# Patient Record
Sex: MALE | Race: WHITE | Employment: OTHER | URBAN - METROPOLITAN AREA
[De-identification: names, ages, dates, MRNs, and addresses within clinical notes are randomized per-mention and may not be internally consistent; named-entity substitution may affect disease eponyms.]

---

## 2017-04-05 ENCOUNTER — OFFICE VISIT (OUTPATIENT)
Dept: PEDIATRICS | Facility: CLINIC | Age: 62
End: 2017-04-05
Payer: COMMERCIAL

## 2017-04-05 VITALS
HEART RATE: 66 BPM | HEIGHT: 69 IN | BODY MASS INDEX: 25.48 KG/M2 | OXYGEN SATURATION: 97 % | DIASTOLIC BLOOD PRESSURE: 78 MMHG | TEMPERATURE: 98.4 F | WEIGHT: 172 LBS | SYSTOLIC BLOOD PRESSURE: 118 MMHG

## 2017-04-05 DIAGNOSIS — H00.011 HORDEOLUM EXTERNUM OF RIGHT UPPER EYELID: Primary | ICD-10-CM

## 2017-04-05 PROCEDURE — 99213 OFFICE O/P EST LOW 20 MIN: CPT | Performed by: INTERNAL MEDICINE

## 2017-04-05 NOTE — NURSING NOTE
"Chief Complaint   Patient presents with     Eye Problem       Initial /78 (Cuff Size: Adult Regular)  Pulse 66  Temp 98.4  F (36.9  C) (Oral)  Ht 5' 9\" (1.753 m)  Wt 172 lb (78 kg)  SpO2 97%  BMI 25.4 kg/m2 Estimated body mass index is 25.4 kg/(m^2) as calculated from the following:    Height as of this encounter: 5' 9\" (1.753 m).    Weight as of this encounter: 172 lb (78 kg).  Medication Reconciliation: carlotta Whittaker CMA    "

## 2017-04-05 NOTE — PROGRESS NOTES
"  SUBJECTIVE:                                                    Jm Mcnulty is a 61 year old male who presents to clinic today for the following health issues:      Eye(s) Problem      Duration: week    Description:  Location: right eye lid swollen        Denies fever, eye pain, discharge  Pain: no  Redness: YES  Discharge: no    Accompanying signs and symptoms:     History (Trauma, foreign body exposure,): None    Precipitating or alleviating factors (contact use): None    Therapies tried and outcome: None       Patient Active Problem List   Diagnosis     CARDIOVASCULAR SCREENING; LDL GOAL LESS THAN 160     Essential hypertension     Tinnitus     Past Surgical History:   Procedure Laterality Date     NO HISTORY OF SURGERY         Social History   Substance Use Topics     Smoking status: Never Smoker     Smokeless tobacco: Never Used     Alcohol use 3.5 - 4.0 oz/week     7 - 8 Standard drinks or equivalent per week     No family history on file.      Current Outpatient Prescriptions   Medication Sig Dispense Refill     multivitamin, therapeutic with minerals (MULTI-VITAMIN) TABS Take 1 tablet by mouth daily 100 tablet 3     calcium carb 1250 mg, 500 mg [place],/vitamin D 200 units (OSCAL WITH D) 500-200 MG-UNIT per tablet Take 1 tablet by mouth 2 times daily (with meals) 90 tablet           OBJECTIVE:                                                    /78 (Cuff Size: Adult Regular)  Pulse 66  Temp 98.4  F (36.9  C) (Oral)  Ht 5' 9\" (1.753 m)  Wt 172 lb (78 kg)  SpO2 97%  BMI 25.4 kg/m2 Body mass index is 25.4 kg/(m^2).  GENERAL:  alert,  no distress  Eyes: PERRL, EOMI conjunctiva and sclera normal, stye present right upper lid     ASSESSMENT/PLAN:                                                        ICD-10-CM    1. Hordeolum externum of right upper eyelid H00.011       rec warm moist compresses frequently to encouraged drainage  Follow-up 5-7 days if not improving    Ismael Inman MD  [facility] " [facility]

## 2017-04-05 NOTE — MR AVS SNAPSHOT
"              After Visit Summary   2017    Jm Mcnulty    MRN: [id number]           Patient Information     Date Of Birth          1955        Visit Information        Provider Department      2017 8:00 AM Ismael Inman MD [facility] [place]        Today's Diagnoses     Hordeolum externum of right upper eyelid    -  1       Follow-ups after your visit        Follow-up notes from your care team     Return if symptoms worsen or fail to improve.      Who to contact     If you have questions or need follow up information about today's clinic visit or your schedule please [facility] directly at [phone].  Normal or non-critical lab and imaging results will be communicated to you by MyChart, letter or phone within 4 business days after the clinic has received the results. If you do not hear from us within 7 days, please contact the clinic through [employer] or phone. If you have a critical or abnormal lab result, we will notify you by phone as soon as possible.  Submit refill requests through [employer] or call your pharmacy and they will forward the refill request to us. Please allow 3 business days for your refill to be completed.          Additional Information About Your Visit        MyChart Information     [employer] lets you send messages to your doctor, view your test results, renew your prescriptions, schedule appointments and more. To sign up, go to [email or web address] . Click on \"Log in\" on the left side of the screen, which will take you to the Welcome page. Then click on \"Sign up Now\" on the right side of the page.     You will be asked to enter the access code listed below, as well as some personal information. Please follow the directions to create your username and password.     Your access code is: [email or web address]  Expires: 2017  8:41 AM     Your access code will  in 90 days. If you need help or a new code, please call your [facility] or " "[phone].        Care EveryWhere ID     This is your Care EveryWhere ID. This could be used by other organizations to access your [place] medical records  [id number]        Your Vitals Were     Pulse Temperature Height Pulse Oximetry BMI (Body Mass Index)       66 98.4  F (36.9  C) (Oral) 5' 9\" (1.753 m) 97% 25.4 kg/m2        Blood Pressure from Last 3 Encounters:   04/05/17 118/78   11/07/16 122/66   01/28/16 108/72    Weight from Last 3 Encounters:   04/05/17 172 lb (78 kg)   11/07/16 169 lb (76.7 kg)   01/28/16 156 lb 12.8 oz (71.1 kg)              Today, you had the following     No orders found for display       Primary Care Provider    Physician No Ref-Primary       No address on file        Thank you!     Thank you for choosing [facility]  for your care. Our goal is always to provide you with excellent care. Hearing back from our patients is one way we can continue to improve our services. Please take a few minutes to complete the written survey that you may receive in the mail after your visit with us. Thank you!             Your Updated Medication List - Protect others around you: Learn how to safely use, store and throw away your medicines at www.disposemymeds.org.          This list is accurate as of: 4/5/17  8:41 AM.  Always use your most recent med list.                   Brand Name Dispense Instructions for use    calcium carb 1250 mg (500 mg [place])/vitamin D 200 units 500-200 MG-UNIT per tablet    OSCAL with D    90 tablet    Take 1 tablet by mouth 2 times daily (with meals)       Multi-vitamin Tabs tablet     100 tablet    Take 1 tablet by mouth daily         "

## 2017-09-26 ENCOUNTER — OFFICE VISIT (OUTPATIENT)
Dept: PEDIATRICS | Facility: CLINIC | Age: 62
End: 2017-09-26
Payer: COMMERCIAL

## 2017-09-26 VITALS
HEART RATE: 64 BPM | SYSTOLIC BLOOD PRESSURE: 104 MMHG | WEIGHT: 166 LBS | BODY MASS INDEX: 24.51 KG/M2 | TEMPERATURE: 97.8 F | DIASTOLIC BLOOD PRESSURE: 66 MMHG

## 2017-09-26 DIAGNOSIS — L29.9 EAR ITCHING: Primary | ICD-10-CM

## 2017-09-26 PROCEDURE — 99213 OFFICE O/P EST LOW 20 MIN: CPT | Performed by: INTERNAL MEDICINE

## 2017-09-26 NOTE — PROGRESS NOTES
SUBJECTIVE:   Jm Mcnulty is a 62 year old male who presents to clinic today for the following health issues:    Ear problem      Duration: on going problem past few months    Description (location/character/radiation): itching    Intensity:  mild    Accompanying signs and symptoms: no pain    History (similar episodes/previous evaluation):   Denies cough, fever, nasal congestion or ear pain    Precipitating or alleviating factors: tried topical abx without improvment    Therapies tried and outcome:          Patient Active Problem List   Diagnosis     CARDIOVASCULAR SCREENING; LDL GOAL LESS THAN 160     Essential hypertension     Tinnitus     Past Surgical History:   Procedure Laterality Date     NO HISTORY OF SURGERY         Social History   Substance Use Topics     Smoking status: Never Smoker     Smokeless tobacco: Never Used     Alcohol use 3.5 - 4.0 oz/week     7 - 8 Standard drinks or equivalent per week     No family history on file.      Current Outpatient Prescriptions   Medication Sig Dispense Refill     multivitamin, therapeutic with minerals (MULTI-VITAMIN) TABS Take 1 tablet by mouth daily 100 tablet 3     calcium carb 1250 mg, 500 mg [place],/vitamin D 200 units (OSCAL WITH D) 500-200 MG-UNIT per tablet Take 1 tablet by mouth 2 times daily (with meals) 90 tablet        ROS: The following systems have been completely reviewed and are negative except as noted in the HPI: CONSTITUTIONAL, HEAD AND NECK    OBJECTIVE:                                                    /66 (Cuff Size: Adult Regular)  Pulse 64  Temp 97.8  F (36.6  C) (Oral)  Wt 166 lb (75.3 kg)  BMI 24.51 kg/m2 Body mass index is 24.51 kg/(m^2).  GENERAL:  alert,  no distress  HENT: ear canals- small amt dry skin, otherwise normal; TMs- normal; oropharynx-normal  NECK: no tenderness, no adenopathy       ASSESSMENT/PLAN:                                                        ICD-10-CM    1. Ear itching L29.9       rec  1%hydrocortisone prn  Follow-up prn    Ismael Inman MD  [facility]

## 2017-09-26 NOTE — NURSING NOTE
"Chief Complaint   Patient presents with     Ear Problem       Initial /66 (Cuff Size: Adult Regular)  Pulse 64  Temp 97.8  F (36.6  C) (Oral)  Wt 166 lb (75.3 kg)  BMI 24.51 kg/m2 Estimated body mass index is 24.51 kg/(m^2) as calculated from the following:    Height as of 4/5/17: 5' 9\" (1.753 m).    Weight as of this encounter: 166 lb (75.3 kg).  Medication Reconciliation: carlotta Whittaker CMA    "

## 2017-09-26 NOTE — MR AVS SNAPSHOT
"              After Visit Summary   2017    Jm Mcnulty    MRN: [id number]           Patient Information     Date Of Birth          1955        Visit Information        Provider Department      2017 11:20 AM Ismael Inman MD [facility] [place]        Today's Diagnoses     Ear itching    -  1       Follow-ups after your visit        Follow-up notes from your care team     Return if symptoms worsen or fail to improve.      Who to contact     If you have questions or need follow up information about today's clinic visit or your schedule please contact [facility] directly at [phone].  Normal or non-critical lab and imaging results will be communicated to you by MyChart, letter or phone within 4 business days after the clinic has received the results. If you do not hear from us within 7 days, please contact the clinic through [employer] or phone. If you have a critical or abnormal lab result, we will notify you by phone as soon as possible.  Submit refill requests through [employer] or call your pharmacy and they will forward the refill request to us. Please allow 3 business days for your refill to be completed.          Additional Information About Your Visit        MyChart Information     [employer] lets you send messages to your doctor, view your test results, renew your prescriptions, schedule appointments and more. To sign up, go to [email or web address] . Click on \"Log in\" on the left side of the screen, which will take you to the Welcome page. Then click on \"Sign up Now\" on the right side of the page.     You will be asked to enter the access code listed below, as well as some personal information. Please follow the directions to create your username and password.     Your access code is: [email or web address]  Expires: 2017 12:25 PM     Your access code will  in 90 days. If you need help or a new code, please call your [facility] or [phone].        Care " EveryWhere ID     This is your Care EveryWhere ID. This could be used by other organizations to access your [place] medical records  [id number]        Your Vitals Were     Pulse Temperature BMI (Body Mass Index)             64 97.8  F (36.6  C) (Oral) 24.51 kg/m2          Blood Pressure from Last 3 Encounters:   09/26/17 104/66   04/05/17 118/78   11/07/16 122/66    Weight from Last 3 Encounters:   09/26/17 166 lb (75.3 kg)   04/05/17 172 lb (78 kg)   11/07/16 169 lb (76.7 kg)              Today, you had the following     No orders found for display       Primary Care Provider    Physician No Ref-Primary       No address on file        Equal Access to Services     TOM RAND : Melia Hodgson, wagiorgio brown, qaybta kaalmada oneil, nimco badillo . So [facility] [phone].    ATENCIÓN: Si habla español, tiene a travis disposición servicios gratuitos de asistencia lingüística. Llame al [phone].    We comply with applicable federal civil rights laws and [place] laws. We do not discriminate on the basis of race, color, national origin, age, disability sex, sexual orientation or gender identity.            Thank you!     Thank you for choosing [facility]  for your care. Our goal is always to provide you with excellent care. Hearing back from our patients is one way we can continue to improve our services. Please take a few minutes to complete the written survey that you may receive in the mail after your visit with us. Thank you!             Your Updated Medication List - Protect others around you: Learn how to safely use, store and throw away your medicines at www.disposemymeds.org.          This list is accurate as of: 9/26/17 12:25 PM.  Always use your most recent med list.                   Brand Name Dispense Instructions for use Diagnosis    calcium carb 1250 mg (500 mg [place])/vitamin D 200 units 500-200 MG-UNIT per tablet    OSCAL with D    90 tablet     Take 1 tablet by mouth 2 times daily (with meals)        Multi-vitamin Tabs tablet     100 tablet    Take 1 tablet by mouth daily

## 2019-09-23 ENCOUNTER — THERAPY VISIT (OUTPATIENT)
Dept: CHIROPRACTIC MEDICINE | Facility: CLINIC | Age: 64
End: 2019-09-23
Payer: COMMERCIAL

## 2019-09-23 DIAGNOSIS — M99.02 THORACIC SEGMENT DYSFUNCTION: ICD-10-CM

## 2019-09-23 DIAGNOSIS — G89.29 CHRONIC BILATERAL LOW BACK PAIN WITHOUT SCIATICA: ICD-10-CM

## 2019-09-23 DIAGNOSIS — M54.6 BILATERAL THORACIC BACK PAIN: ICD-10-CM

## 2019-09-23 DIAGNOSIS — M54.50 CHRONIC BILATERAL LOW BACK PAIN WITHOUT SCIATICA: ICD-10-CM

## 2019-09-23 DIAGNOSIS — M99.03 SOMATIC DYSFUNCTION OF LUMBAR REGION: Primary | ICD-10-CM

## 2019-09-23 DIAGNOSIS — M99.04 SOMATIC DYSFUNCTION OF SACRAL REGION: ICD-10-CM

## 2019-09-23 PROCEDURE — 98941 CHIROPRACT MANJ 3-4 REGIONS: CPT | Mod: AT | Performed by: CHIROPRACTOR

## 2019-09-23 PROCEDURE — 99203 OFFICE O/P NEW LOW 30 MIN: CPT | Mod: GA | Performed by: CHIROPRACTOR

## 2019-09-26 PROBLEM — M54.6 BILATERAL THORACIC BACK PAIN: Status: ACTIVE | Noted: 2019-09-26

## 2019-09-26 PROBLEM — M99.04 SOMATIC DYSFUNCTION OF SACRAL REGION: Status: ACTIVE | Noted: 2019-09-26

## 2019-09-26 NOTE — PROGRESS NOTES
Initial Chiropractic Clinic Visit    PCP: Ismael Inman    Jm Mcnulty is a 64 year old male who is seen  as a self referral presenting with chronic mid back to low back pain . Patient reports that the onset was over 10 years.  When asked, patient denies:, falling, slipping, bending and reaching or sleeping awkwardly. The pt reports mid back to low back pain from wear and tear over the years. He states more recently he was taking a barbell class and noted and increase in mid back pain that is now affecting the low back. The pt grades the px a 4/10 on VAS. The px is central. He denies weakness in the extremities or other unusual sx.  Prior to onset, the patient was able to sleep a full night. Patient notes that due to symptoms, they can only sleep interrupted. Jm Mcnulty notes   sleeping rated at a 4/10 difficulty  and prior to this incident it was 1/10.        Injury: There was no acute injury related to this episode     Location of Pain: bilateral mid back to low back at the following level(s) T5 , T9 , L4 , Sacrum  and PSIS Right   Duration of Pain:  Over 10 years   Rating of Pain at worst: 4/10  Rating of Pain Currently: 2/10  Symptoms are better with: prior Chiropractic   Symptoms are worse with: sleeping and lifting   Additional Features: none      Health History  as reported by the patient:    How does the patient rate their own health:   Good    Current or past medical history:   High blood pressure    Medical allergies  None    Past Traumas/Surgeries  None    Family History  Family History   Family history unknown: Yes       Medications:  Anti-depressants and High blood pressure    Occupation:  Retired     Primary job tasks:   Computer work    Barriers as home/work:   none    Additional health Issues:     None             Jm was asked to complete the Oswestry Low Back Disability Index and Brian Start Back screening tool, today in the office.  Disability score: 2%. Keel Start Total Score:0  "Sub Score: 0     Review of Systems  Musculoskeletal: as above  Remainder of review of systems is negative including constitutional, CV, pulmonary, GI, Skin and Neurologic except as noted in HPI or medical history.    Past Medical History:   Diagnosis Date     Transient global amnesia     2 episodes, 2006 and 2011     Past Surgical History:   Procedure Laterality Date     NO HISTORY OF SURGERY       Objective  There were no vitals taken for this visit.      GENERAL APPEARANCE: healthy, alert and no distress   GAIT: NORMAL  SKIN: no suspicious lesions or rashes  NEURO: Normal strength and tone, mentation intact and speech normal  PSYCH:  mentation appears normal and affect normal/bright    Low back exam:    Inspection:  \"     no visible deformity in the low back       normal skin\",    ROM:       full flexion       limited extension due to pain    Tender:       paraspinal muscles       B QL     Non Tender:       remainder of lumbar spine    Strength:       hip flexion 5/5 Normal       knee extension 5/5 Normal       ankle dorsiflexion 5/5 Normal       ankle plantarflexion 5/5 Normal       dorsiflexion of the great toe 5/5 Normal    Reflexes:       patellar (L3, L4) 2 bilaterally       achilles tendons (S1) 2 bilaterally    Sensation:      grossly intact throughout lower extremities    Special tests:  Kemps - Right positive, low back pain and Left positive, SLR - Right negative and Left negative, Gaenslen's - Right negative and Left negative and Fabere - Right positive, hip and low back pain and Left negative    Segmental spinal dysfunction/restrictions found at:  T5 , T9 , L5 , Sacrum  and PSIS Left     The following soft tissue hypotonicities were observed:Quad lumb: bilateral, referred pain: no  T paraspinals: ache and dull pain, no    Trigger points were found in:none     Muscle spasm found in:Quad lumb      Radiology:  None     Assessment:    1. Somatic dysfunction of lumbar region    2. Chronic bilateral low back " pain without sciatica    3. Somatic dysfunction of sacral region    4. Bilateral thoracic back pain    5. Thoracic segment dysfunction        RX ordered/plan of care  Anticipated outcomes  Possible risks and side effects    After discussing the risk and benefits of care, patient consented to treatment    Prognosis: Good      Patient's condition:  Patient had restrictions pre-manipulation    Treatment effectiveness:  Post manipulation there is better intersegmental movement and Patient claims to feel looser post manipulation      Plan:    Procedures:  Evaluation and Management:  21391 Moderate level exam 30 min    CMT:  08608 Chiropractic manipulative treatment 3-4 regions performed   Thoracic: Diversified, T5, T9, Prone  Lumbar: Diversified, L4, Side posture  Pelvis: Drop Table, Sacrum , PSIS Left , Prone    Modalities:  None performed this visit    Therapeutic procedures:  None    Response to Treatment  Reduction in symptoms as reported by patient      Treatment plan and goals:  Goals:  Sleeping 7-8 hours  Bending over     Frequency of care  Duration of care is estimated to be 2-6 weeks, from the initial treatment.  It is estimated that the patient will need a total of 2-6 visits to resolve this episode.  For the initial therapeutic trial of care, the frequency is recommended at 1 X week, once daily.  A reevaluation would be clinically appropriate in 2-6 visits, to determine progress and further course of care.    In-Office Treatment  Evaluation  Spinal Chiropractic Manipulative Therapy        Recommendations:    Instructions:  none     Follow-up:    Return to care in 1 week.       Discussed the assessment with the patient.      Disclaimer: This note consists of symbols derived from keyboarding, dictation and/or voice recognition software. As a result, there may be errors in the script that have gone undetected. Please consider this when interpreting information found in this chart.

## 2019-10-31 ENCOUNTER — HEALTH MAINTENANCE LETTER (OUTPATIENT)
Age: 64
End: 2019-10-31

## 2019-11-04 ENCOUNTER — THERAPY VISIT (OUTPATIENT)
Dept: CHIROPRACTIC MEDICINE | Facility: CLINIC | Age: 64
End: 2019-11-04
Payer: COMMERCIAL

## 2019-11-04 DIAGNOSIS — M54.6 BILATERAL THORACIC BACK PAIN: ICD-10-CM

## 2019-11-04 DIAGNOSIS — M99.02 THORACIC SEGMENT DYSFUNCTION: Primary | ICD-10-CM

## 2019-11-04 DIAGNOSIS — M99.03 SOMATIC DYSFUNCTION OF LUMBAR REGION: ICD-10-CM

## 2019-11-04 DIAGNOSIS — M99.04 SOMATIC DYSFUNCTION OF SACRAL REGION: ICD-10-CM

## 2019-11-04 PROCEDURE — 97810 ACUP 1/> WO ESTIM 1ST 15 MIN: CPT | Mod: GA | Performed by: CHIROPRACTOR

## 2019-11-04 PROCEDURE — 98940 CHIROPRACT MANJ 1-2 REGIONS: CPT | Mod: AT | Performed by: CHIROPRACTOR

## 2019-11-04 NOTE — PROGRESS NOTES
Visit #:  2    Subjective:  Jm Mcnulty is a 64 year old male who is seen in f/u up for:        Thoracic segment dysfunction  Somatic dysfunction of lumbar region  Bilateral thoracic back pain  Somatic dysfunction of sacral region.     Since last visit on 9/23/2019,  Jm Mcnulty reports:    Area of chief complaint:  Thoracic and Lumbar :  Symptoms are graded at 2/10. The quality is described as stiff, achey, dull.  Motion has increased, but is still not normal. The pt reports at least 40% improvement since initial presentation. He reports tension in the mid back area today with tension in the low back at times. He has returned to his routine workout regime. The pt denies weakness in the extremities or other unusual sx. Patient feels that they are improved due to a reduction in symptoms.    Since last visit the patient feels that they are 40 percent  improved from last visit.       Objective:  The following was observed:    P: palpatory tenderness Lumbar erector spine and Quad lumb Bilaterally  A: static palpation demonstrates intersegmental asymmetry   R: motion palpation notes restricted motion  T: hypertonicity at: T-spine paraspinal Bilaterally    Segmental spinal dysfunction/restrictions found at:  T4 , T7 , T8 , Sacrum  and PSIS Right       Assessment:    Diagnoses:      1. Thoracic segment dysfunction    2. Somatic dysfunction of lumbar region    3. Bilateral thoracic back pain    4. Somatic dysfunction of sacral region        Patient's condition:  Patient had restrictions pre-manipulation    Treatment effectiveness:  Post manipulation there is better intersegmental movement and Patient claims to feel looser post manipulation      Procedures:  CMT:  53074 Chiropractic manipulative treatment 1-2 regions performed   Thoracic: Diversified, T7, T8, Prone  Pelvis: Drop Table, Sacrum , PSIS Right , Prone    Modalities:  36470: Acupuncture, for 15 minutes:  Points: [place] Points in thoracic and lumbar  spine  Ahsi point in thoracic paraspinals   For 15 minutes    Therapeutic procedures:  None    Response to Treatment  Reduction in symptoms as reported by patient    Prognosis: Good    Progress towards Goals: Patient has met the goal.     Recommendations:    Instructions:  none    Follow-up:    Return to care in if sx persist .

## 2019-12-16 ENCOUNTER — HEALTH MAINTENANCE LETTER (OUTPATIENT)
Age: 64
End: 2019-12-16

## 2021-01-15 ENCOUNTER — HEALTH MAINTENANCE LETTER (OUTPATIENT)
Age: 66
End: 2021-01-15

## 2021-09-04 ENCOUNTER — HEALTH MAINTENANCE LETTER (OUTPATIENT)
Age: 66
End: 2021-09-04

## 2022-02-19 ENCOUNTER — HEALTH MAINTENANCE LETTER (OUTPATIENT)
Age: 67
End: 2022-02-19

## 2022-10-22 ENCOUNTER — HEALTH MAINTENANCE LETTER (OUTPATIENT)
Age: 67
End: 2022-10-22

## 2023-04-01 ENCOUNTER — HEALTH MAINTENANCE LETTER (OUTPATIENT)
Age: 68
End: 2023-04-01